# Patient Record
Sex: MALE | Race: WHITE | Employment: FULL TIME | URBAN - METROPOLITAN AREA
[De-identification: names, ages, dates, MRNs, and addresses within clinical notes are randomized per-mention and may not be internally consistent; named-entity substitution may affect disease eponyms.]

---

## 2024-04-30 ENCOUNTER — TELEPHONE (OUTPATIENT)
Age: 64
End: 2024-04-30

## 2024-04-30 ENCOUNTER — OFFICE VISIT (OUTPATIENT)
Age: 64
End: 2024-04-30
Payer: COMMERCIAL

## 2024-04-30 ENCOUNTER — TRANSCRIBE ORDERS (OUTPATIENT)
Facility: HOSPITAL | Age: 64
End: 2024-04-30

## 2024-04-30 VITALS
DIASTOLIC BLOOD PRESSURE: 80 MMHG | WEIGHT: 194.2 LBS | OXYGEN SATURATION: 100 % | RESPIRATION RATE: 18 BRPM | HEART RATE: 92 BPM | SYSTOLIC BLOOD PRESSURE: 130 MMHG | BODY MASS INDEX: 25.74 KG/M2 | HEIGHT: 73 IN

## 2024-04-30 DIAGNOSIS — R07.9 CHEST PAIN, UNSPECIFIED TYPE: ICD-10-CM

## 2024-04-30 DIAGNOSIS — I10 PRIMARY HYPERTENSION: ICD-10-CM

## 2024-04-30 DIAGNOSIS — R00.2 PALPITATIONS: Primary | ICD-10-CM

## 2024-04-30 DIAGNOSIS — Z00.00 ROUTINE GENERAL MEDICAL EXAMINATION AT A HEALTH CARE FACILITY: Primary | ICD-10-CM

## 2024-04-30 PROCEDURE — 3075F SYST BP GE 130 - 139MM HG: CPT | Performed by: SPECIALIST

## 2024-04-30 PROCEDURE — 93000 ELECTROCARDIOGRAM COMPLETE: CPT | Performed by: SPECIALIST

## 2024-04-30 PROCEDURE — 3079F DIAST BP 80-89 MM HG: CPT | Performed by: SPECIALIST

## 2024-04-30 PROCEDURE — 99204 OFFICE O/P NEW MOD 45 MIN: CPT | Performed by: SPECIALIST

## 2024-04-30 RX ORDER — AMLODIPINE BESYLATE 5 MG/1
10 TABLET ORAL DAILY
COMMUNITY

## 2024-04-30 RX ORDER — BRIMONIDINE TARTRATE 2 MG/ML
2 SOLUTION/ DROPS OPHTHALMIC 2 TIMES DAILY
COMMUNITY

## 2024-04-30 RX ORDER — MESALAMINE 0.38 G/1
1.5 CAPSULE, EXTENDED RELEASE ORAL DAILY
COMMUNITY

## 2024-04-30 NOTE — PROGRESS NOTES
Chief Complaint   Patient presents with    New Patient     Cardiac Evaluation     Vitals:    04/30/24 0841   BP: 130/80   Site: Left Upper Arm   Position: Sitting   Cuff Size: Medium Adult   Pulse: 92   Resp: 18   SpO2: 100%   Weight: 88.1 kg (194 lb 3.2 oz)   Height: 1.854 m (6' 1\")     Occasional chest pain- No SOB  NO recent hospitilizations/urgent care visits  No refills needed.

## 2024-04-30 NOTE — TELEPHONE ENCOUNTER
Enrolled with [employer] - Ordered and being shipped to patient's home address on file.  ETA within 5-7 business days.        30 day monitor  Received: Today  Edith Rice MD Baker, Jami  Can you please send him a 30 day monitor for heart racing spells    Thanks  SK

## 2024-05-15 ENCOUNTER — HOSPITAL ENCOUNTER (OUTPATIENT)
Facility: HOSPITAL | Age: 64
Discharge: HOME | End: 2024-05-18
Attending: SPECIALIST

## 2024-05-15 DIAGNOSIS — Z00.00 ROUTINE GENERAL MEDICAL EXAMINATION AT A HEALTH CARE FACILITY: ICD-10-CM

## 2024-05-15 PROCEDURE — 75571 CT HRT W/O DYE W/CA TEST: CPT

## 2024-06-11 NOTE — PROGRESS NOTES
Patient: Sebastian Monet  : 1960    Primary Cardiologist: Edith Rice MD. [facility]  Last Office Visit: 24    Today's Date: 2024        HISTORY OF PRESENT ILLNESS:     History of Present Illness:  Presents today for follow-up. Periodically feels palpitations. Chest pain at times.   Denies worsening shortness of breath.     Last seen in .  A few weeks ago - he was at [employer] and had 10-12 drinks - next day pulse was fast and continued during day.  Got better over next several days.  He had some atypical CP and a belch.        PAST MEDICAL HISTORY:     Past Medical History:   Diagnosis Date    Hypertension     PSVT (paroxysmal supraventricular tachycardia) ([facility])                CURRENT MEDICATIONS:    .  Current Outpatient Medications   Medication Sig Dispense Refill    Multiple Vitamins-Minerals (CENTRUM SILVER 50+MEN PO) Take by mouth      GARLIC PO Take by mouth      MAGNESIUM PO Take by mouth      KRILL OIL PO Take by mouth      aspirin 81 MG EC tablet Take 1 tablet by mouth Twice a Week Mon & Fri      mesalamine (APRISO) 0.375 g extended release capsule Take 1 capsule by mouth daily 1 a day      amLODIPine (NORVASC) 5 MG tablet Take 2 tablets by mouth daily Patient takes 1.5 tabs a day      brimonidine (ALPHAGAN) 0.2 % ophthalmic solution Place 2 drops into the left eye in the morning and at bedtime One drop twice a day in the left eye.      timolol (BETIMOL) 0.5 % ophthalmic solution 1 drop 2 times daily       No current facility-administered medications for this visit.       No Known Allergies      SOCIAL HISTORY:     Social History     Tobacco Use    Smoking status: Former     Types: Cigarettes    Smokeless tobacco: Never   Substance Use Topics    Alcohol use: Yes    Drug use: Never         FAMILY HISTORY:     History reviewed. No pertinent family history.      REVIEW OF SYMPTOMS:     Review of Symptoms:  Constitutional: Negative for fever, chills  HEENT: Negative for

## 2024-06-13 ENCOUNTER — OFFICE VISIT (OUTPATIENT)
Age: 64
End: 2024-06-13
Payer: COMMERCIAL

## 2024-06-13 VITALS
SYSTOLIC BLOOD PRESSURE: 102 MMHG | WEIGHT: 193.8 LBS | BODY MASS INDEX: 25.69 KG/M2 | OXYGEN SATURATION: 98 % | HEART RATE: 76 BPM | DIASTOLIC BLOOD PRESSURE: 64 MMHG | HEIGHT: 73 IN

## 2024-06-13 DIAGNOSIS — I10 PRIMARY HYPERTENSION: ICD-10-CM

## 2024-06-13 DIAGNOSIS — R93.1 AGATSTON CAC SCORE, <100: ICD-10-CM

## 2024-06-13 DIAGNOSIS — R00.2 PALPITATIONS: Primary | ICD-10-CM

## 2024-06-13 PROCEDURE — 3074F SYST BP LT 130 MM HG: CPT

## 2024-06-13 PROCEDURE — 3078F DIAST BP <80 MM HG: CPT

## 2024-06-13 PROCEDURE — 99214 OFFICE O/P EST MOD 30 MIN: CPT

## 2024-06-13 RX ORDER — ASPIRIN 81 MG/1
81 TABLET ORAL
COMMUNITY

## 2024-06-13 NOTE — PROGRESS NOTES
Chief Complaint   Patient presents with    Palpitations    Hypertension    Chest Pain     Vitals:    06/13/24 1017   BP: 102/64   Site: Left Upper Arm   Position: Sitting   Pulse: 76   SpO2: 98%   Weight: 87.9 kg (193 lb 12.8 oz)   Height: 1.854 m (6' 1\")         Chest pain: DENIED     Recent hospital stays: DENIED     Refills: DENIED